# Patient Record
Sex: FEMALE | Race: WHITE | NOT HISPANIC OR LATINO
[De-identification: names, ages, dates, MRNs, and addresses within clinical notes are randomized per-mention and may not be internally consistent; named-entity substitution may affect disease eponyms.]

---

## 2017-09-19 ENCOUNTER — APPOINTMENT (OUTPATIENT)
Dept: DERMATOLOGY | Facility: CLINIC | Age: 47
End: 2017-09-19
Payer: COMMERCIAL

## 2017-09-19 VITALS — DIASTOLIC BLOOD PRESSURE: 76 MMHG | SYSTOLIC BLOOD PRESSURE: 118 MMHG

## 2017-09-19 DIAGNOSIS — L25.9 UNSPECIFIED CONTACT DERMATITIS, UNSPECIFIED CAUSE: ICD-10-CM

## 2017-09-19 DIAGNOSIS — Z87.2 PERSONAL HISTORY OF DISEASES OF THE SKIN AND SUBCUTANEOUS TISSUE: ICD-10-CM

## 2017-09-19 DIAGNOSIS — Z86.69 PERSONAL HISTORY OF OTHER DISEASES OF THE NERVOUS SYSTEM AND SENSE ORGANS: ICD-10-CM

## 2017-09-19 DIAGNOSIS — Z78.9 OTHER SPECIFIED HEALTH STATUS: ICD-10-CM

## 2017-09-19 PROCEDURE — 99203 OFFICE O/P NEW LOW 30 MIN: CPT

## 2017-12-24 ENCOUNTER — TRANSCRIPTION ENCOUNTER (OUTPATIENT)
Age: 47
End: 2017-12-24

## 2019-07-08 ENCOUNTER — APPOINTMENT (OUTPATIENT)
Dept: BREAST CENTER | Facility: CLINIC | Age: 49
End: 2019-07-08
Payer: COMMERCIAL

## 2019-07-08 VITALS
HEIGHT: 63 IN | SYSTOLIC BLOOD PRESSURE: 109 MMHG | BODY MASS INDEX: 33.13 KG/M2 | WEIGHT: 187 LBS | HEART RATE: 49 BPM | DIASTOLIC BLOOD PRESSURE: 67 MMHG

## 2019-07-08 DIAGNOSIS — Z80.3 FAMILY HISTORY OF MALIGNANT NEOPLASM OF BREAST: ICD-10-CM

## 2019-07-08 DIAGNOSIS — R92.0 MAMMOGRAPHIC MICROCALCIFICATION FOUND ON DIAGNOSTIC IMAGING OF BREAST: ICD-10-CM

## 2019-07-08 PROCEDURE — 99243 OFF/OP CNSLTJ NEW/EST LOW 30: CPT

## 2019-07-08 RX ORDER — FLUOCINONIDE 0.5 MG/G
0.05 CREAM TOPICAL
Qty: 60 | Refills: 0 | Status: DISCONTINUED | COMMUNITY
Start: 2017-04-07 | End: 2019-07-08

## 2019-07-08 RX ORDER — CLOBETASOL PROPIONATE 0.5 MG/G
0.05 CREAM TOPICAL
Qty: 60 | Refills: 0 | Status: DISCONTINUED | COMMUNITY
Start: 2017-06-23 | End: 2019-07-08

## 2019-07-08 RX ORDER — DESOXIMETASONE 2.5 MG/G
0.25 OINTMENT TOPICAL TWICE DAILY
Qty: 1 | Refills: 2 | Status: DISCONTINUED | COMMUNITY
Start: 2017-09-19 | End: 2019-07-08

## 2019-07-08 RX ORDER — PNV NO.95/FERROUS FUM/FOLIC AC 28MG-0.8MG
TABLET ORAL DAILY
Refills: 0 | Status: ACTIVE | COMMUNITY

## 2019-07-08 RX ORDER — TRIAMCINOLONE ACETONIDE 1 MG/G
0.1 CREAM TOPICAL
Qty: 80 | Refills: 0 | Status: DISCONTINUED | COMMUNITY
Start: 2017-08-08 | End: 2019-07-08

## 2019-07-08 NOTE — PAST MEDICAL HISTORY
[Menstruating] : The patient is menstruating [Menarche Age ____] : age at menarche was [unfilled] [Irregular Cycle Intervals] : are  irregular [Total Preg ___] : G[unfilled]

## 2019-07-22 ENCOUNTER — FORM ENCOUNTER (OUTPATIENT)
Age: 49
End: 2019-07-22

## 2019-07-23 ENCOUNTER — OUTPATIENT (OUTPATIENT)
Dept: OUTPATIENT SERVICES | Facility: HOSPITAL | Age: 49
LOS: 1 days | End: 2019-07-23
Payer: COMMERCIAL

## 2019-07-23 ENCOUNTER — APPOINTMENT (OUTPATIENT)
Dept: MAMMOGRAPHY | Facility: CLINIC | Age: 49
End: 2019-07-23
Payer: COMMERCIAL

## 2019-07-23 ENCOUNTER — RESULT REVIEW (OUTPATIENT)
Age: 49
End: 2019-07-23

## 2019-07-23 DIAGNOSIS — R92.0 MAMMOGRAPHIC MICROCALCIFICATION FOUND ON DIAGNOSTIC IMAGING OF BREAST: ICD-10-CM

## 2019-07-23 DIAGNOSIS — Z00.8 ENCOUNTER FOR OTHER GENERAL EXAMINATION: ICD-10-CM

## 2019-07-23 PROCEDURE — 77065 DX MAMMO INCL CAD UNI: CPT | Mod: 26,LT

## 2019-07-23 PROCEDURE — 77065 DX MAMMO INCL CAD UNI: CPT

## 2019-07-23 PROCEDURE — A4648: CPT

## 2019-07-23 PROCEDURE — 88305 TISSUE EXAM BY PATHOLOGIST: CPT

## 2019-07-23 PROCEDURE — 19081 BX BREAST 1ST LESION STRTCTC: CPT | Mod: LT

## 2019-07-23 PROCEDURE — 88305 TISSUE EXAM BY PATHOLOGIST: CPT | Mod: 26

## 2019-07-23 PROCEDURE — 19081 BX BREAST 1ST LESION STRTCTC: CPT

## 2019-07-24 ENCOUNTER — TRANSCRIPTION ENCOUNTER (OUTPATIENT)
Age: 49
End: 2019-07-24

## 2019-07-25 LAB — SURGICAL PATHOLOGY STUDY: SIGNIFICANT CHANGE UP

## 2019-07-25 NOTE — PHYSICAL EXAM
[Normocephalic] : normocephalic [Sclera nonicteric] : sclera nonicteric [Conjunctiva pink] : conjunctiva pink [No Supraclavicular Adenopathy] : no supraclavicular adenopathy [Supple] : supple [No Cervical Adenopathy] : no cervical adenopathy [No Thyromegaly] : no thyromegaly [Clear to Auscultation Bilat] : clear to auscultation bilaterally [No Gallops] : no gallops [Normal Sinus Rhythm] : normal sinus rhythm [Examined in the supine and seated position] : examined in the supine and seated position [No Rubs] : no pericardial rub [Symmetrical] : symmetrical [No dominant masses] : no dominant masses in right breast  [No dominant masses] : no dominant masses left breast [No Nipple Discharge] : no left nipple discharge [No Nipple Retraction] : no left nipple retraction [Soft] : abdomen soft [No Axillary Lymphadenopathy] : no left axillary lymphadenopathy [No Hepato-Splenomegaly] : no hepato-splenomegaly [No Edema] : no edema [No Ulceration] : no ulceration [No Rashes] : no rashes [de-identified] : Bilateral Mastopexy scars

## 2019-07-25 NOTE — DATA REVIEWED
[FreeTextEntry1] : Mammogram:   4/23/19        Findings:  Bilateral asymmetries for which bilateral mammographic views and ultrasound recommended.  \par \par Diagnostic Mammogram:  6/4/19    Findings:  Bilateral upper outer asymmetries efface with spot compression imaging. Focal asymmetries UO anterior left breast persist with spot compression imaging.  Left magnification view demonstrate a grouping of increasing but benign appearing calcifications UOQ left breast. \par \par Breast ultrasound, bilateral:  6/4/19   Findings:   Bilateral cysts which appear to c/w focal mammographic asymmetries.\par

## 2019-07-25 NOTE — HISTORY OF PRESENT ILLNESS
[FreeTextEntry1] : 48 year old female was referred by Dr. Tg Simpson for a consultation regarding a cluster of left breast microcalcifications.  Her family history is significant for a sister diagnosed with breast cancer at age 49.

## 2019-07-25 NOTE — CONSULT LETTER
[Dear  ___] : Dear ~MASSIEL, [Please see my note below.] : Please see my note below. [Consult Letter:] : I had the pleasure of evaluating your patient, [unfilled]. [Consult Closing:] : Thank you very much for allowing me to participate in the care of this patient.  If you have any questions, please do not hesitate to contact me. [Sincerely,] : Sincerely, [DrAnge  ___] : Dr. DESAI [FreeTextEntry2] : Tg Simpson MD [FreeTextEntry3] : Marie Matt MD FACS\par

## 2019-12-31 ENCOUNTER — APPOINTMENT (OUTPATIENT)
Dept: BREAST CENTER | Facility: CLINIC | Age: 49
End: 2019-12-31
Payer: COMMERCIAL

## 2019-12-31 VITALS
HEART RATE: 62 BPM | HEIGHT: 63 IN | DIASTOLIC BLOOD PRESSURE: 67 MMHG | BODY MASS INDEX: 25.87 KG/M2 | SYSTOLIC BLOOD PRESSURE: 98 MMHG | WEIGHT: 146 LBS

## 2019-12-31 PROCEDURE — 99214 OFFICE O/P EST MOD 30 MIN: CPT

## 2019-12-31 NOTE — CONSULT LETTER
[Dear  ___] : Dear ~MASSIEL, [Sincerely,] : Sincerely, [Courtesy Letter:] : I had the pleasure of seeing your patient, [unfilled], in my office today. [FreeTextEntry2] : Tg Simpson MD [FreeTextEntry3] : Marie Matt MD FACS\par Assistant Professor of Surgery\par [employer] \par

## 2019-12-31 NOTE — DATA REVIEWED
[FreeTextEntry1] : Mammogram:   4/23/19        Findings:  Bilateral asymmetries for which bilateral mammographic views and ultrasound recommended.  \par \par Diagnostic Mammogram:  6/4/19    Findings:  Bilateral upper outer asymmetries efface with spot compression imaging. Focal asymmetries UO anterior left breast persist with spot compression imaging.  Left magnification view demonstrate a grouping of increasing but benign appearing calcifications UOQ left breast. \par \par Breast ultrasound, bilateral:  6/4/19   Findings:   Bilateral cysts which appear to c/w focal mammographic asymmetries.\par \par Left Stereotactic Biopsy:  7/23/19   Pathology:  Nonproliferative fibrocystic changes.  Sclerosing adenosis with calcifications.\par

## 2019-12-31 NOTE — PAST MEDICAL HISTORY
[Menstruating] : The patient is menstruating [Menarche Age ____] : age at menarche was [unfilled] [Irregular Cycle Intervals] : are  irregular [Total Preg ___] : G[unfilled] [Definite ___ (Date)] : the last menstrual period was [unfilled]

## 2019-12-31 NOTE — PHYSICAL EXAM
[Sclera nonicteric] : sclera nonicteric [Normocephalic] : normocephalic [Conjunctiva pink] : conjunctiva pink [No Cervical Adenopathy] : no cervical adenopathy [No Thyromegaly] : no thyromegaly [Supple] : supple [No Supraclavicular Adenopathy] : no supraclavicular adenopathy [Clear to Auscultation Bilat] : clear to auscultation bilaterally [Normal Sinus Rhythm] : normal sinus rhythm [No Gallops] : no gallops [No Rubs] : no pericardial rub [Examined in the supine and seated position] : examined in the supine and seated position [Symmetrical] : symmetrical [No dominant masses] : no dominant masses in right breast  [No dominant masses] : no dominant masses left breast [No Nipple Retraction] : no right nipple retraction [No Nipple Discharge] : no left nipple discharge [No Axillary Lymphadenopathy] : no left axillary lymphadenopathy [Soft] : abdomen soft [No Hepato-Splenomegaly] : no hepato-splenomegaly [No Edema] : no edema [No Rashes] : no rashes [No Ulceration] : no ulceration [Grade 2] : Ptosis Grade 2 [de-identified] : Bilateral Mastopexy scars [de-identified] : Fibronodular tissue [de-identified] : Fibronodular tissue

## 2019-12-31 NOTE — HISTORY OF PRESENT ILLNESS
[FreeTextEntry1] : 49 year old female underwent a stereotactic biopsy of a cluster of left breast microcalcification in 07/19.  Pathology was benign. Her family history is significant for a sister diagnosed with breast cancer at age 49 who had negative genetic testing.   \par The patient was referred by her gynecologist for an annual breast examination.

## 2020-01-01 ENCOUNTER — TRANSCRIPTION ENCOUNTER (OUTPATIENT)
Age: 50
End: 2020-01-01

## 2020-07-01 NOTE — PAST MEDICAL HISTORY
[Menarche Age ____] : age at menarche was [unfilled] [Menstruating] : The patient is menstruating [Definite ___ (Date)] : the last menstrual period was [unfilled] [Total Preg ___] : G[unfilled] [Irregular Cycle Intervals] : are  irregular

## 2020-07-02 ENCOUNTER — APPOINTMENT (OUTPATIENT)
Dept: BREAST CENTER | Facility: CLINIC | Age: 50
End: 2020-07-02
Payer: COMMERCIAL

## 2020-07-02 VITALS
SYSTOLIC BLOOD PRESSURE: 105 MMHG | HEIGHT: 63 IN | HEART RATE: 73 BPM | BODY MASS INDEX: 26.22 KG/M2 | DIASTOLIC BLOOD PRESSURE: 70 MMHG | WEIGHT: 148 LBS

## 2020-07-02 PROCEDURE — 99214 OFFICE O/P EST MOD 30 MIN: CPT

## 2020-07-02 RX ORDER — MULTIVIT-MIN/IRON/FOLIC ACID/K 18-600-40
500 CAPSULE ORAL
Refills: 0 | Status: ACTIVE | COMMUNITY

## 2020-07-02 NOTE — DATA REVIEWED
[FreeTextEntry1] : Mammogram:   4/23/19        Findings:  Bilateral asymmetries for which bilateral mammographic views and ultrasound recommended.  \par \par Diagnostic Mammogram:  6/4/19    Findings:  Bilateral upper outer asymmetries efface with spot compression imaging. Focal asymmetries UO anterior left breast persist with spot compression imaging.  Left magnification view demonstrate a grouping of increasing but benign appearing calcifications UOQ left breast. \par \par Breast ultrasound, bilateral:  6/4/19   Findings:   Bilateral cysts which appear to c/w focal mammographic asymmetries.\par \par Left Stereotactic Biopsy:  7/23/19   Pathology:  Nonproliferative fibrocystic changes.  Sclerosing adenosis with calcifications.\par \par Mammogram:  6/4/20         Findings:  Scattered fibroglandular elements.  3 small groupings of calcifications UOQ right breast. The posterior group shows layering on the lateral view.  These are most likely stable dating back to 2018.  \par \par Breast Ultrasound:  Scattered bilateral subcentimeter cysts.  Fibrocystic are with layering calcificdations Right breast at 10:00 N3.\par ( Images reviewed with the patient and disc returned to her).\par \par

## 2020-07-02 NOTE — HISTORY OF PRESENT ILLNESS
[FreeTextEntry1] : 49 year old female with a family history of breast cancer was noted to have 3 tiny clusters of right breast calcifications on recent mammography.   According to the radiologist, they have a benign appearance and are probable stable.

## 2020-07-02 NOTE — PHYSICAL EXAM
[Sclera nonicteric] : sclera nonicteric [Normocephalic] : normocephalic [Conjunctiva pink] : conjunctiva pink [No Supraclavicular Adenopathy] : no supraclavicular adenopathy [No Cervical Adenopathy] : no cervical adenopathy [Supple] : supple [Normal Sinus Rhythm] : normal sinus rhythm [No Thyromegaly] : no thyromegaly [Clear to Auscultation Bilat] : clear to auscultation bilaterally [Examined in the supine and seated position] : examined in the supine and seated position [No Rubs] : no pericardial rub [No Gallops] : no gallops [No dominant masses] : no dominant masses left breast [Symmetrical] : symmetrical [No dominant masses] : no dominant masses in right breast  [Grade 2] : Ptosis Grade 2 [No Nipple Retraction] : no right nipple retraction [No Nipple Discharge] : no left nipple discharge [No Axillary Lymphadenopathy] : no left axillary lymphadenopathy [No Edema] : no edema [Soft] : abdomen soft [No Hepato-Splenomegaly] : no hepato-splenomegaly [No Ulceration] : no ulceration [No Rashes] : no rashes [de-identified] : Bilateral Mastopexy scars [de-identified] : Fibronodular tissue [de-identified] : Fibronodular tissue

## 2020-07-02 NOTE — CONSULT LETTER
[Dear  ___] : Dear ~MASSIEL, [Please see my note below.] : Please see my note below. [Courtesy Letter:] : I had the pleasure of seeing your patient, [unfilled], in my office today. [Sincerely,] : Sincerely, [FreeTextEntry2] : Tg Simpson MD [FreeTextEntry3] : Marie Matt MD FACS\par

## 2020-10-11 ENCOUNTER — TRANSCRIPTION ENCOUNTER (OUTPATIENT)
Age: 50
End: 2020-10-11

## 2020-12-31 ENCOUNTER — APPOINTMENT (OUTPATIENT)
Dept: BREAST CENTER | Facility: CLINIC | Age: 50
End: 2020-12-31
Payer: COMMERCIAL

## 2020-12-31 VITALS
WEIGHT: 190 LBS | HEIGHT: 63 IN | BODY MASS INDEX: 33.66 KG/M2 | DIASTOLIC BLOOD PRESSURE: 77 MMHG | SYSTOLIC BLOOD PRESSURE: 121 MMHG | HEART RATE: 83 BPM

## 2020-12-31 DIAGNOSIS — R92.0 MAMMOGRAPHIC MICROCALCIFICATION FOUND ON DIAGNOSTIC IMAGING OF BREAST: ICD-10-CM

## 2020-12-31 PROCEDURE — 99072 ADDL SUPL MATRL&STAF TM PHE: CPT

## 2020-12-31 PROCEDURE — 99214 OFFICE O/P EST MOD 30 MIN: CPT

## 2020-12-31 NOTE — HISTORY OF PRESENT ILLNESS
[FreeTextEntry1] : 50 year old female with a family history of breast cancer was noted to have 3 tiny clusters of right breast calcifications on  mammography in 06/20.  A F/U right diagnostic mammogram on 12/8/20 showed stable, benign appearing calcifications.  Patient presents for a breast examination.

## 2020-12-31 NOTE — CONSULT LETTER
[Dear  ___] : Dear ~MASSIEL, [Courtesy Letter:] : I had the pleasure of seeing your patient, [unfilled], in my office today. [Sincerely,] : Sincerely, [FreeTextEntry2] : Tg Simpson MD [FreeTextEntry3] : Marie Matt MD FACS\par

## 2020-12-31 NOTE — PHYSICAL EXAM
[Normocephalic] : normocephalic [Sclera nonicteric] : sclera nonicteric [Conjunctiva pink] : conjunctiva pink [Supple] : supple [No Supraclavicular Adenopathy] : no supraclavicular adenopathy [No Cervical Adenopathy] : no cervical adenopathy [No Thyromegaly] : no thyromegaly [Clear to Auscultation Bilat] : clear to auscultation bilaterally [Normal Sinus Rhythm] : normal sinus rhythm [No Gallops] : no gallops [No Rubs] : no pericardial rub [Examined in the supine and seated position] : examined in the supine and seated position [Symmetrical] : symmetrical [Grade 2] : Ptosis Grade 2 [No dominant masses] : no dominant masses in right breast  [No dominant masses] : no dominant masses left breast [No Nipple Retraction] : no left nipple retraction [No Nipple Discharge] : no left nipple discharge [No Axillary Lymphadenopathy] : no left axillary lymphadenopathy [Soft] : abdomen soft [No Hepato-Splenomegaly] : no hepato-splenomegaly [No Edema] : no edema [No Rashes] : no rashes [No Ulceration] : no ulceration [de-identified] : Bilateral Mastopexy scars [de-identified] : Fibronodular tissue [de-identified] : Fibronodular tissue

## 2020-12-31 NOTE — DATA REVIEWED
[FreeTextEntry1] : Mammogram:   4/23/19        Findings:  Bilateral asymmetries for which bilateral mammographic views and ultrasound recommended.  \par \par Diagnostic Mammogram:  6/4/19    Findings:  Bilateral upper outer asymmetries efface with spot compression imaging. Focal asymmetries UO anterior left breast persist with spot compression imaging.  Left magnification view demonstrate a grouping of increasing but benign appearing calcifications UOQ left breast. \par \par Breast ultrasound, bilateral:  6/4/19   Findings:   Bilateral cysts which appear to c/w focal mammographic asymmetries.\par \par Left Stereotactic Biopsy:  7/23/19   Pathology:  Nonproliferative fibrocystic changes.  Sclerosing adenosis with calcifications.\par \par Mammogram:  6/4/20         Findings:  Scattered fibroglandular elements.  3 small groupings of calcifications UOQ right breast. The posterior group shows layering on the lateral view.  These are most likely stable dating back to 2018.  \par \par Breast Ultrasound:  Scattered bilateral subcentimeter cysts.  Fibrocystic are with layering calcifications Right breast at 10:00 N3.\par ( Images reviewed with the patient and disc returned to her).\par \par Right Diagnostic mammogram: 12/8/20   Findings:   Stable, benign appearing microcalcifications right breast.\par \par

## 2020-12-31 NOTE — PAST MEDICAL HISTORY
[Menstruating] : The patient is menstruating [Menarche Age ____] : age at menarche was [unfilled] [Definite ___ (Date)] : the last menstrual period was [unfilled] [Irregular Cycle Intervals] : are  irregular [Total Preg ___] : G[unfilled]

## 2021-10-25 ENCOUNTER — NON-APPOINTMENT (OUTPATIENT)
Age: 51
End: 2021-10-25

## 2021-11-04 PROBLEM — R87.618 UNEXPLAINED ENDOMETRIAL CELLS ON CERVICAL CYTOLOGY: Status: ACTIVE | Noted: 2021-11-04

## 2021-11-05 ENCOUNTER — NON-APPOINTMENT (OUTPATIENT)
Age: 51
End: 2021-11-05

## 2021-11-05 ENCOUNTER — APPOINTMENT (OUTPATIENT)
Dept: GYNECOLOGIC ONCOLOGY | Facility: CLINIC | Age: 51
End: 2021-11-05
Payer: COMMERCIAL

## 2021-11-05 VITALS
HEIGHT: 63 IN | OXYGEN SATURATION: 95 % | DIASTOLIC BLOOD PRESSURE: 86 MMHG | WEIGHT: 215 LBS | HEART RATE: 88 BPM | BODY MASS INDEX: 38.09 KG/M2 | SYSTOLIC BLOOD PRESSURE: 139 MMHG

## 2021-11-05 DIAGNOSIS — R87.618 OTHER ABNORMAL CYTOLOGICAL FINDINGS ON SPECIMENS FROM CERVIX UTERI: ICD-10-CM

## 2021-11-05 PROCEDURE — 76857 US EXAM PELVIC LIMITED: CPT | Mod: 59

## 2021-11-05 PROCEDURE — 76830 TRANSVAGINAL US NON-OB: CPT | Mod: 59

## 2021-11-05 PROCEDURE — 99204 OFFICE O/P NEW MOD 45 MIN: CPT | Mod: 25

## 2021-11-11 NOTE — CHIEF COMPLAINT
[FreeTextEntry1] : [facility]\par \par [facility] [phone] at [address] [place] [id number]\par

## 2021-11-11 NOTE — PHYSICAL EXAM
[Chaperone Present] : A chaperone was present in the examining room during all aspects of the physical examination [Normal] : Mood and affect: Normal [FreeTextEntry1] : Patient was interviewed and examined with chaperone present. Name of chaperone: Josiane Hackett

## 2021-11-11 NOTE — ASSESSMENT
[FreeTextEntry1] : 50yo female with endometrial and atypical cells/HPV neg  on cervical pap smear, stenotic cervix with inability to have in office  endometrial sampling. Pelvic US today revealed a 6.4cm uterus, ? rt adnexal cyst 3.2cm and a left paraovarian cyst 0.9cm.  Discussed with pt the need for her to have endometrial sampling to r/o a precancerous or cancerous condition. Discussed possibly attempting another EMB today but pt is driving to [place] this week and would rather not. Discussed a D&C, however, pt is hesitant to go to a hospital or surgery center. Recommended she return to office in approx 2 weeks to attempt another EMB in office. Advised her to take Motrin 600mg one hour prior and Misoprostol the evening before.

## 2021-11-11 NOTE — HISTORY OF PRESENT ILLNESS
[FreeTextEntry1] : This 52yo G0 LMP 8/19/21 referred by Dr. Simpson for evaluation of abnormal cervical pap smear. On 6/10/21 a routine cervical pap smear revealed ASCUS with endometrial cells present, HPV negative. Pelvic US on 7/2/21 revealed single uterine fibroid and a cyst likely in upper uterine anterior junctional zone, RV uterus measuring 6.9 x 4.3 x 5.7cm. EMB was attempted but cervix was stenotic. A follow up pelvic US on 10/2021 She was referred here for further management. Denies pelvic pain or cramping. Pt reports irregular menses for the past few years occurring every few months. Denies heavy menses. Denies postcoital bleeding.  \par \par Family history of premenopausal breast cancer in her sister who had negative genetic testing.  \par \par Health maintenance:\par \par Pap smear-6/2021-ASCUS, endometrial cells, HPV neg  \par Mammo-6/2020-rt breast calcifications, six month follow up diag mammo rt breast in 12/20- Bi Rad 3-follows with breast surgeon  \par Colonoscopy-never  \par \par

## 2021-11-11 NOTE — END OF VISIT
[FreeTextEntry3] : Written by Josiane OROURKE, acting as a scribe for Dr. Mike Ruff.\par This note accurately reflects the work and decisions made by me.\par

## 2021-12-03 ENCOUNTER — APPOINTMENT (OUTPATIENT)
Dept: GYNECOLOGIC ONCOLOGY | Facility: CLINIC | Age: 51
End: 2021-12-03
Payer: COMMERCIAL

## 2021-12-03 VITALS
OXYGEN SATURATION: 97 % | WEIGHT: 215 LBS | HEART RATE: 83 BPM | BODY MASS INDEX: 38.09 KG/M2 | SYSTOLIC BLOOD PRESSURE: 138 MMHG | RESPIRATION RATE: 16 BRPM | DIASTOLIC BLOOD PRESSURE: 85 MMHG | HEIGHT: 63 IN

## 2021-12-03 PROCEDURE — 58100 BIOPSY OF UTERUS LINING: CPT

## 2021-12-07 ENCOUNTER — NON-APPOINTMENT (OUTPATIENT)
Age: 51
End: 2021-12-07

## 2021-12-08 ENCOUNTER — APPOINTMENT (OUTPATIENT)
Dept: BREAST CENTER | Facility: CLINIC | Age: 51
End: 2021-12-08
Payer: COMMERCIAL

## 2021-12-08 VITALS
SYSTOLIC BLOOD PRESSURE: 136 MMHG | HEIGHT: 63 IN | DIASTOLIC BLOOD PRESSURE: 82 MMHG | HEART RATE: 89 BPM | WEIGHT: 215 LBS | BODY MASS INDEX: 38.09 KG/M2

## 2021-12-08 DIAGNOSIS — Z91.89 OTHER SPECIFIED PERSONAL RISK FACTORS, NOT ELSEWHERE CLASSIFIED: ICD-10-CM

## 2021-12-08 DIAGNOSIS — Z80.3 FAMILY HISTORY OF MALIGNANT NEOPLASM OF BREAST: ICD-10-CM

## 2021-12-08 DIAGNOSIS — Z80.1 FAMILY HISTORY OF MALIGNANT NEOPLASM OF TRACHEA, BRONCHUS AND LUNG: ICD-10-CM

## 2021-12-08 PROCEDURE — 99213 OFFICE O/P EST LOW 20 MIN: CPT

## 2021-12-08 RX ORDER — MISOPROSTOL 100 UG/1
100 TABLET ORAL
Qty: 2 | Refills: 0 | Status: DISCONTINUED | COMMUNITY
Start: 2021-11-05 | End: 2021-12-08

## 2021-12-08 NOTE — PHYSICAL EXAM
[Normocephalic] : normocephalic [Sclera nonicteric] : sclera nonicteric [Conjunctiva pink] : conjunctiva pink [Supple] : supple [No Supraclavicular Adenopathy] : no supraclavicular adenopathy [No Cervical Adenopathy] : no cervical adenopathy [No Thyromegaly] : no thyromegaly [Clear to Auscultation Bilat] : clear to auscultation bilaterally [Normal Sinus Rhythm] : normal sinus rhythm [No Gallops] : no gallops [No Rubs] : no pericardial rub [Examined in the supine and seated position] : examined in the supine and seated position [Symmetrical] : symmetrical [Grade 2] : Ptosis Grade 2 [No dominant masses] : no dominant masses in right breast  [No dominant masses] : no dominant masses left breast [No Nipple Retraction] : no left nipple retraction [No Nipple Discharge] : no left nipple discharge [No Axillary Lymphadenopathy] : no left axillary lymphadenopathy [Soft] : abdomen soft [No Hepato-Splenomegaly] : no hepato-splenomegaly [No Edema] : no edema [No Rashes] : no rashes [No Ulceration] : no ulceration [Bra Size: ___] : Bra Size: [unfilled] [de-identified] : Bilateral Mastopexy scars

## 2021-12-08 NOTE — HISTORY OF PRESENT ILLNESS
[FreeTextEntry1] : 51 year old female with a family history of breast cancer was noted to have 3 tiny clusters of right breast calcifications on  mammography in 06/20.  A F/U right diagnostic mammogram on 12/8/20 showed stable, benign appearing calcifications.  Patient presents for a breast examination.\par \par She is perimenopausal with her last menses in July 2021.  She had an endometrial biopsy since endometrial cells were found on her pap smear and is waiting for those results.

## 2021-12-08 NOTE — CONSULT LETTER
[Dear  ___] : Dear ~MASSIEL, [Courtesy Letter:] : I had the pleasure of seeing your patient, [unfilled], in my office today. [Sincerely,] : Sincerely, [FreeTextEntry2] : Tg Simpson MD [FreeTextEntry3] : \par

## 2021-12-08 NOTE — DATA REVIEWED
[FreeTextEntry1] : Bilateral mammogram and breast ultrasound ([facility]) 5/15/2021:  No mammographic or sonographic evidence of malignancy.\par \par (The images were reviewed and returned to the patient.)

## 2021-12-09 LAB — CORE LAB BIOPSY: NORMAL

## 2021-12-09 NOTE — END OF VISIT
[FreeTextEntry3] : Written by Christen Kim, acting as a scribe for Dr. Mike Ruff \par This note accurately reflects the work and decisions made by me.

## 2021-12-09 NOTE — PROCEDURE
[Endometrial Biopsy] : an endometrial biopsy [Patient] : the patient [Verbal Consent] : verbal consent was obtained prior to the procedure and is detailed in the patient's record [Yes] : the specimen was sent to pathology [No Complications] : none [Tolerated Well] : the patient tolerated the procedure well [Post procedure instructions and information given] : post procedure instructions and information given and reviewed with patient. [FreeTextEntry1] : Uterus sound to 6 cm.

## 2021-12-09 NOTE — ASSESSMENT
[FreeTextEntry1] : Endometrial biopsy was performed today. Patient tolerating biopsy well. I discussed with patient that if her endometrial biopsy is not adequate she will need a D&C, patient stated she understood and agreed to comply.

## 2021-12-09 NOTE — HISTORY OF PRESENT ILLNESS
[FreeTextEntry1] : This 52 y/o was recently referred by Dr. Simpson for evaluation of abnormal cervical pap smear. . On 6/10/21 a routine cervical pap smear revealed ASCUS with endometrial cells present, HPV negative. Pelvic US on 7/2/21 revealed single uterine fibroid and a cyst likely in upper uterine anterior junctional zone, RV uterus measuring 6.9 x 4.3 x 5.7cm. EMB was attempted but cervix was stenotic. A follow up pelvic US on 10/2021 She was referred here for further management. \par \par On exam patients cervix was stenotic with inability to have in office endometrial sampling. Pelvic ultrasound performed on 11/5/21 revealed a 6.4cm uterus, ? rt adnexal cyst 3.2cm and a left paraovarian cyst 0.9cm. Discussed with pt the need for her to have endometrial sampling to r/o a precancerous or cancerous condition.  Discussed a D&C, however, pt was hesitant to go to a hospital or surgery center. Recommended she return to office in approx 2 weeks to attempt another EMB in office. Advised her to take Motrin 600mg one hour prior and Misoprostol the evening before. \par

## 2021-12-09 NOTE — REASON FOR VISIT
[FreeTextEntry1] : [place] Location \par \par [facility] [place]. [phone]\par [address] [place] [address]

## 2021-12-09 NOTE — PHYSICAL EXAM
[Chaperone Present] : A chaperone was present in the examining room during all aspects of the physical examination [Normal] : Bimanual Exam: Normal [FreeTextEntry1] : Christen Kim Medical assistant chaperoned during gynecologic exam.

## 2021-12-10 ENCOUNTER — APPOINTMENT (OUTPATIENT)
Dept: GYNECOLOGIC ONCOLOGY | Facility: CLINIC | Age: 51
End: 2021-12-10
Payer: COMMERCIAL

## 2021-12-10 PROCEDURE — 99212 OFFICE O/P EST SF 10 MIN: CPT

## 2021-12-27 NOTE — HISTORY OF PRESENT ILLNESS
[FreeTextEntry1] : This 52 y/o was recently referred by Dr. Simpson for evaluation of abnormal cervical pap smear. . On 6/10/21 a routine cervical pap smear revealed ASCUS with endometrial cells present, HPV negative. Pelvic US on 7/2/21 revealed single uterine fibroid and a cyst likely in upper uterine anterior junctional zone, RV uterus measuring 6.9 x 4.3 x 5.7cm. EMB was attempted but cervix was stenotic. A follow up pelvic US on 10/2021 She was referred here for further management. \par \par On exam patients cervix was stenotic with inability to have in office endometrial sampling. Pelvic ultrasound performed on 11/5/21 revealed a 6.4cm uterus, ? rt adnexal cyst 3.2cm and a left paraovarian cyst 0.9cm. Discussed with pt the need for her to have endometrial sampling to r/o a precancerous or cancerous condition. Discussed a D&C, however, pt was hesitant to go to a hospital or surgery center. Recommended she return to office to attempt another EMB in office. Advised her to take Motrin 600mg one hour prior and Misoprostol the evening before. Patient returned on 12/3/21 for her EMB and returns to the office today to discuss results. \par \par EMB revealed scant specimen, strips of inactive endometrium and mucous. \par

## 2021-12-27 NOTE — ASSESSMENT
[FreeTextEntry1] : Discussed w/ patient that her biopsy was adequate and normal. I am recommending patient follow up in April for a follow up ultrasound. Patient understands that if she develops any symptoms or concerns prior to visit she should return to see me sooner.

## 2021-12-27 NOTE — PHYSICAL EXAM
[Normal] : Mood and affect: Normal [FreeTextEntry1] : Christen Kim Medical assistant chaperoned during results and discussion.

## 2021-12-27 NOTE — REASON FOR VISIT
[FreeTextEntry1] : [place] Location \par \par [facility] [place]. [phone]\par [address] [place] [address]

## 2022-04-12 PROBLEM — R87.610 ASCUS OF CERVIX WITH NEGATIVE HIGH RISK HPV: Status: ACTIVE | Noted: 2021-11-04

## 2022-04-13 ENCOUNTER — APPOINTMENT (OUTPATIENT)
Dept: GYNECOLOGIC ONCOLOGY | Facility: CLINIC | Age: 52
End: 2022-04-13
Payer: COMMERCIAL

## 2022-04-13 DIAGNOSIS — R87.610 ATYPICAL SQUAMOUS CELLS OF UNDETERMINED SIGNIFICANCE ON CYTOLOGIC SMEAR OF CERVIX (ASC-US): ICD-10-CM

## 2022-04-13 PROCEDURE — 76857 US EXAM PELVIC LIMITED: CPT | Mod: 59

## 2022-04-13 PROCEDURE — 99212 OFFICE O/P EST SF 10 MIN: CPT | Mod: 25

## 2022-04-13 PROCEDURE — 76830 TRANSVAGINAL US NON-OB: CPT | Mod: 59

## 2022-04-22 NOTE — ASSESSMENT
[FreeTextEntry1] : Ultrasound performed in office revealed uterus measuring 6.0 x 4.3 x 3.5 cm with fibroid measuring 3.6 x 2.9 x 2.4 cm, endometrial thickness = 4 mm. \par Right ovary - 2.0 x 1.7 x 1.4 cm, cyst not visualized\par Left ovary - 1.8 x 1.4 x 1.5 cm with cyst measuring 4 mm. \par \par I reviewed patient's US which is very reassuring given 4 mm lining. In light of these findings the patient no longer requires f/u with a gynecologic oncologist. She may resume routine GYN care.

## 2022-04-22 NOTE — PHYSICAL EXAM
[Normal] : Adnexa(ae): Normal [FreeTextEntry1] : Crystal Doty MA was present the entire time of gynecological exam.

## 2022-04-22 NOTE — REASON FOR VISIT
[FreeTextEntry1] : [place] Location\par \par [facility] [phone] at [address] [place] [id number]\par  \par f/u Ultrasound

## 2022-04-22 NOTE — HISTORY OF PRESENT ILLNESS
[FreeTextEntry1] : 52 y/o patient being followed for evaluation of abnormal pap smear from 6/10/21 revealing ASCUS with endometrial cells present, HPV negative. Pelvic US on 7/2/21 revealed single uterine fibroid and a cyst likely in upper uterine anterior junctional zone, RV uterus measuring 6.9 x 4.3 x 5.7cm. EMB was attempted but cervix was stenotic.\par Ultrasound performed on 11/5/21 revealed a 6.4cm uterus, ? rt adnexal cyst 3.2cm and a left paraovarian cyst 0.9cm. Discussed with pt the need for her to have endometrial sampling to r/o a precancerous or cancerous condition. Discussed a D&C, however, pt was hesitant to go to a hospital or surgery center. I recommended repeat attempt at endometrial biopsy which was successful in office on 12/3/21 revealing scant specimen, strips of inactive endometrium and mucous. I discussed with her that given adequate and normal biopsy, I recommend f/u US for which she presents today. Patient denies any pelvic pain or abnormal VB.

## 2022-12-20 ENCOUNTER — APPOINTMENT (OUTPATIENT)
Dept: ORTHOPEDIC SURGERY | Facility: CLINIC | Age: 52
End: 2022-12-20

## 2022-12-20 VITALS — WEIGHT: 230 LBS | BODY MASS INDEX: 40.75 KG/M2 | HEIGHT: 63 IN

## 2022-12-20 DIAGNOSIS — M79.18 MYALGIA, OTHER SITE: ICD-10-CM

## 2022-12-20 DIAGNOSIS — M17.11 UNILATERAL PRIMARY OSTEOARTHRITIS, RIGHT KNEE: ICD-10-CM

## 2022-12-20 PROCEDURE — 73564 X-RAY EXAM KNEE 4 OR MORE: CPT | Mod: RT

## 2022-12-20 PROCEDURE — 99204 OFFICE O/P NEW MOD 45 MIN: CPT

## 2022-12-20 RX ORDER — NAPROXEN 500 MG/1
500 TABLET ORAL TWICE DAILY
Qty: 60 | Refills: 0 | Status: ACTIVE | COMMUNITY
Start: 2022-12-20 | End: 1900-01-01

## 2022-12-20 NOTE — HISTORY OF PRESENT ILLNESS
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

## 2022-12-20 NOTE — IMAGING

## 2022-12-20 NOTE — ASSESSMENT
[FreeTextEntry1] : Right  X-Ray Examination of the KNEE (4 views): medial and patellofemoral degenerate changes.\par \par - We discussed their diagnosis and treatment options at length including the risks and benefits of both surgical and non-surgical options.\par - We will continue conservative treatment with activity modification, PT, icing, weight loss, and anti-inflammatory medications.\par - The patient was provided with a PT prescription to work on ROM, hip ER/abductors strengthening, quad/hamstring stretches and strengthening, and other exercises.\par - The patient was advised to let pain guide the gradual advancement of activities.\par - We discussed the possible of injections in the future.\par - napryn rx\par - Patient was given a prescription for an anti-inflammatory medication.  They will take it for the next week and then on an as needed basis, as long as there are no medical contra-indications.  Patient is counseled on possible GI, renal, and cardiovascular side effects.\par - Follow up as needed in 6 weeks as to re-evaluate\par \par \par